# Patient Record
Sex: FEMALE | Race: WHITE | HISPANIC OR LATINO | URBAN - METROPOLITAN AREA
[De-identification: names, ages, dates, MRNs, and addresses within clinical notes are randomized per-mention and may not be internally consistent; named-entity substitution may affect disease eponyms.]

---

## 2022-06-06 ENCOUNTER — OFFICE VISIT (OUTPATIENT)
Dept: URBAN - METROPOLITAN AREA SURGERY CENTER 15 | Facility: SURGERY CENTER | Age: 82
End: 2022-06-06

## 2022-06-21 PROBLEM — 428283002 HISTORY OF POLYP OF COLON: Status: ACTIVE | Noted: 2022-06-21

## 2022-07-18 ENCOUNTER — OFFICE VISIT (OUTPATIENT)
Dept: URBAN - METROPOLITAN AREA SURGERY CENTER 15 | Facility: SURGERY CENTER | Age: 82
End: 2022-07-18

## 2025-06-13 ENCOUNTER — OFFICE VISIT (OUTPATIENT)
Dept: URBAN - METROPOLITAN AREA CLINIC 23 | Facility: CLINIC | Age: 85
End: 2025-06-13

## 2025-06-18 ENCOUNTER — OFFICE VISIT (OUTPATIENT)
Dept: URBAN - METROPOLITAN AREA CLINIC 23 | Facility: CLINIC | Age: 85
End: 2025-06-18

## 2025-06-25 ENCOUNTER — OFFICE VISIT (OUTPATIENT)
Dept: URBAN - METROPOLITAN AREA CLINIC 23 | Facility: CLINIC | Age: 85
End: 2025-06-25

## 2025-07-02 ENCOUNTER — DASHBOARD ENCOUNTERS (OUTPATIENT)
Age: 85
End: 2025-07-02

## 2025-07-02 ENCOUNTER — OFFICE VISIT (OUTPATIENT)
Dept: URBAN - METROPOLITAN AREA CLINIC 23 | Facility: CLINIC | Age: 85
End: 2025-07-02
Payer: MEDICARE

## 2025-07-02 DIAGNOSIS — K86.2 PANCREATIC CYST: ICD-10-CM

## 2025-07-02 PROCEDURE — 99203 OFFICE O/P NEW LOW 30 MIN: CPT

## 2025-07-02 RX ORDER — NEBIBOLOL 10 MG/1
1 TABLET TABLET ORAL ONCE A DAY
Qty: 30 | Status: ACTIVE | COMMUNITY

## 2025-07-02 NOTE — HPI-TODAY'S VISIT:
84-year-old female referred by Dr. Dunn for 4mm pancreatic lesion on CT and MRI.  She was seen by PCP and due to vaginal bleeding CT abdomen pelvis with contrast ordered.  CT showed 3 mm hypodensity in pancreatic body.  MRI abdomen with 4 mm cystic lesion in pancreatic head.  No ductal dilation.  Today, she is here with her daughter.  She states that due to an episode of vaginal bleeding she had CT scan.  No abdominal pain, unintentional weight loss or family history of pancreatic cancer. Patient states she does not want any more MRIs.   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  No cystic lesion in the body of the pancreas.  No ductal dilation.  CT abdomen/pelvis 04/2025-3 mm hypodensity in pancreatic body